# Patient Record
Sex: FEMALE | Race: WHITE | NOT HISPANIC OR LATINO | Employment: UNEMPLOYED | URBAN - METROPOLITAN AREA
[De-identification: names, ages, dates, MRNs, and addresses within clinical notes are randomized per-mention and may not be internally consistent; named-entity substitution may affect disease eponyms.]

---

## 2020-07-24 ENCOUNTER — HOSPITAL ENCOUNTER (EMERGENCY)
Facility: CLINIC | Age: 35
Discharge: HOME | End: 2020-07-25
Attending: EMERGENCY MEDICINE | Admitting: EMERGENCY MEDICINE
Payer: COMMERCIAL

## 2020-07-24 DIAGNOSIS — R79.89 ELEVATED LFTS: ICD-10-CM

## 2020-07-24 DIAGNOSIS — K80.50 BILIARY COLIC: ICD-10-CM

## 2020-07-24 PROCEDURE — 99284 EMERGENCY DEPT VISIT MOD MDM: CPT | Mod: 25

## 2020-07-24 ASSESSMENT — ENCOUNTER SYMPTOMS
ABDOMINAL PAIN: 1
SHORTNESS OF BREATH: 0

## 2020-07-24 NOTE — ED AVS SNAPSHOT
[facility] Emergency Department  [address]  [facility] [address]  Phone:  [phone]  Fax:  [phone]                                    Sharonda Betancur   MRN: [id number]    Department:  [facility] Emergency Department   Date of Visit:  7/24/2020           After Visit Summary Signature Page    I have received my discharge instructions, and my questions have been answered. I have discussed any challenges I see with this plan with the nurse or doctor.    ..........................................................................................................................................  Patient/Patient Representative Signature      ..........................................................................................................................................  Patient Representative Print Name and Relationship to Patient    ..................................................               ................................................  Date                                   Time    ..........................................................................................................................................  Reviewed by Signature/Title    ...................................................              ..............................................  Date                                               Time          22EPIC Rev 08/18

## 2020-07-25 ENCOUNTER — APPOINTMENT (OUTPATIENT)
Dept: ULTRASOUND IMAGING | Facility: CLINIC | Age: 35
End: 2020-07-25
Attending: EMERGENCY MEDICINE
Payer: COMMERCIAL

## 2020-07-25 VITALS
HEART RATE: 125 BPM | TEMPERATURE: 98.4 F | SYSTOLIC BLOOD PRESSURE: 145 MMHG | RESPIRATION RATE: 16 BRPM | DIASTOLIC BLOOD PRESSURE: 89 MMHG | OXYGEN SATURATION: 99 %

## 2020-07-25 LAB
ALBUMIN SERPL-MCNC: 3.8 G/DL (ref 3.4–5)
ALP SERPL-CCNC: 236 U/L (ref 40–150)
ALT SERPL W P-5'-P-CCNC: 176 U/L (ref 0–50)
ANION GAP SERPL CALCULATED.3IONS-SCNC: 6 MMOL/L (ref 3–14)
AST SERPL W P-5'-P-CCNC: 214 U/L (ref 0–45)
B-HCG FREE SERPL-ACNC: <5 IU/L
BASOPHILS # BLD AUTO: 0.1 10E9/L (ref 0–0.2)
BASOPHILS NFR BLD AUTO: 0.3 %
BILIRUB SERPL-MCNC: 0.5 MG/DL (ref 0.2–1.3)
BUN SERPL-MCNC: 23 MG/DL (ref 7–30)
CALCIUM SERPL-MCNC: 8.8 MG/DL (ref 8.5–10.1)
CHLORIDE SERPL-SCNC: 108 MMOL/L (ref 94–109)
CO2 SERPL-SCNC: 26 MMOL/L (ref 20–32)
CREAT SERPL-MCNC: 0.98 MG/DL (ref 0.52–1.04)
DIFFERENTIAL METHOD BLD: ABNORMAL
EOSINOPHIL # BLD AUTO: 0.1 10E9/L (ref 0–0.7)
EOSINOPHIL NFR BLD AUTO: 0.6 %
ERYTHROCYTE [DISTWIDTH] IN BLOOD BY AUTOMATED COUNT: 13.2 % (ref 10–15)
GFR SERPL CREATININE-BSD FRML MDRD: 76 ML/MIN/{1.73_M2}
GLUCOSE SERPL-MCNC: 104 MG/DL (ref 70–99)
HCT VFR BLD AUTO: 45 % (ref 35–47)
HGB BLD-MCNC: 14 G/DL (ref 11.7–15.7)
IMM GRANULOCYTES # BLD: 0.1 10E9/L (ref 0–0.4)
IMM GRANULOCYTES NFR BLD: 0.4 %
LIPASE SERPL-CCNC: 352 U/L (ref 73–393)
LYMPHOCYTES # BLD AUTO: 1.1 10E9/L (ref 0.8–5.3)
LYMPHOCYTES NFR BLD AUTO: 7.4 %
MCH RBC QN AUTO: 29.7 PG (ref 26.5–33)
MCHC RBC AUTO-ENTMCNC: 31.1 G/DL (ref 31.5–36.5)
MCV RBC AUTO: 96 FL (ref 78–100)
MONOCYTES # BLD AUTO: 0.9 10E9/L (ref 0–1.3)
MONOCYTES NFR BLD AUTO: 6.2 %
NEUTROPHILS # BLD AUTO: 12.3 10E9/L (ref 1.6–8.3)
NEUTROPHILS NFR BLD AUTO: 85.1 %
NRBC # BLD AUTO: 0 10*3/UL
NRBC BLD AUTO-RTO: 0 /100
PLATELET # BLD AUTO: 228 10E9/L (ref 150–450)
POTASSIUM SERPL-SCNC: 4.2 MMOL/L (ref 3.4–5.3)
PROT SERPL-MCNC: 7.6 G/DL (ref 6.8–8.8)
RBC # BLD AUTO: 4.71 10E12/L (ref 3.8–5.2)
SODIUM SERPL-SCNC: 140 MMOL/L (ref 133–144)
WBC # BLD AUTO: 14.5 10E9/L (ref 4–11)

## 2020-07-25 PROCEDURE — 76705 ECHO EXAM OF ABDOMEN: CPT

## 2020-07-25 PROCEDURE — 83690 ASSAY OF LIPASE: CPT | Performed by: EMERGENCY MEDICINE

## 2020-07-25 PROCEDURE — 84702 CHORIONIC GONADOTROPIN TEST: CPT

## 2020-07-25 PROCEDURE — 80053 COMPREHEN METABOLIC PANEL: CPT | Performed by: EMERGENCY MEDICINE

## 2020-07-25 PROCEDURE — 85025 COMPLETE CBC W/AUTO DIFF WBC: CPT | Performed by: EMERGENCY MEDICINE

## 2020-07-25 ASSESSMENT — ENCOUNTER SYMPTOMS
COUGH: 0
NAUSEA: 0
VOMITING: 0
FEVER: 0

## 2020-07-25 NOTE — ED PROVIDER NOTES
History   Chief Complaint  Abdominal Pain    The history is provided by the patient.      Sharonda Betancur is a 34 year old female 11 weeks postpartum who presents for evaluation of severe abdominal pain that began while unloading the  this evening. It started as a cramping sensation and progressed to more severe pain. At its worst she rated it as a 9/10 in severity. The pain is above her belly button and below her diaphragm. The pain radiated into her back. She denies shortness of breath. She does note that the pain is improved now and rates it as a 2/10 in severity. No history of abdominal surgery. She had a similar feeling a week ago. She has not had dinner, but for lunch she had breaded chicken for lunch.     Allergies  Sulfa Drugs    Medications  Levothyroxine  Albuterol inhaler  Bupropion inhaler  Seroquel    Past Medical History  Macrocosmic baby  Dermatofibroma of RLE  Bipolar II disorder  Hypothyroidism   Asthma  Migraines     Past Surgical History  Tonsillectomy and adenoidectomy   Toe surgery  [place] teeth extraction  Levonorgestrel releasing system     Family History  Father - depression  Mother - asthma, depression, borderline personality disorder, preeclampsia   Sister(s) - dissociative disorder, preeclampsia     Social History  The patient was unaccompanied to the ED.  Smoking Status: never  Smokeless Tobacco: never  Alcohol Use: not currently    Review of Systems   Constitutional: Negative for fever.   Respiratory: Negative for cough and shortness of breath.    Cardiovascular: Negative for chest pain.   Gastrointestinal: Positive for abdominal pain. Negative for nausea and vomiting.   All other systems reviewed and are negative.    Physical Exam     Physical Exam    Patient Vitals for the past 24 hrs:   BP Temp Temp src Pulse Heart Rate Resp SpO2   07/24/20 2250 136/89 98.4  F (36.9  C) Oral 111 111 16 100 %     Nursing note and vitals reviewed.  Constitutional: Cooperative.   HENT:    Mouth/Throat: Moist mucous membranes.   Eyes: EOMI, nonicteric sclera  Cardiovascular: Normal rate at time of my exam, regular rhythm, no murmurs, rubs, or gallops  Pulmonary/Chest: Effort normal and breath sounds normal. No respiratory distress. No wheezes. No rales.   Abdominal: Soft. Mild RUQ TTP, nondistended, no guarding or rigidity.   Musculoskeletal: Normal range of motion.   Neurological: Alert. Moves all extremities spontaneously.   Skin: Skin is warm and dry. No rash noted.   Psychiatric: Normal mood and affect.     Emergency Department Course   Imaging:  Radiology findings were communicated with the patient who voiced understanding of the findings.    Abdomen US, limited (RUQ only)   Final Result   IMPRESSION:   1.  Gallbladder sludge without other evidence of cholecystitis.        Laboratory:  Laboratory findings were communicated with the patient who voiced understanding of the findings.    Lipase: 352  CBC: WBC 14.5 (H) o/w WNL (HGB 14.0, )  ISTAT HCG Quantitative: <5.0    Emergency Department Course:  Past medical records, nursing notes, and vitals reviewed.    2345 I physically examined the patient as documented above.    IV was inserted and blood was drawn for laboratory testing, results above.    The patient was sent for radiographs while in the emergency department, results above.     0118 I rechecked the patient and discussed the findings of their workup thus far.     Findings and plan explained to the Patient. Patient discharged home with instructions regarding supportive care, medications, and reasons to return. The importance of close follow-up was reviewed.     I personally reviewed the laboratory and imaging results with the Patient and answered all related questions prior to discharge.     Impression & Plan   Medical Decision Makin-year-old female presents with sudden onset cramping upper abdominal/right upper quadrant abdominal pain.  She reports this felt like a labor pain.   It was so severe she called EMS, the by the time she arrived to emergency department, she was essentially pain-free.  Her exam is notable for some mild right upper quadrant tenderness to palpation.  Her vital signs are normal apart from some mild tachycardia on arrival that improved while she was here.  I considered a broad differential for patient including biliary disease, reflux, kidney stone, bowel obstruction.  I also considered more rare but life-threatening conditions such as aortic dissection, ACS, PE.  Given patient is essentially pain-free upon arrival, the above emergent etiologies are quite unlikely.  Further corroborating this is patient's lab work which is suggestive of elevated LFTs, and her right upper quadrant ultrasound that does suggest some sludge and debris in her gallbladder.  Symptoms are classic for biliary colic, and patient was given discharge instructions for follow-up with primary care for LFT recheck with consideration of surgical evaluation at that time.  Patient is symptomatically improved without intervention at time of discharge.  All questions answered.    Diagnosis:    ICD-10-CM    1. Biliary colic  K80.50    2. Elevated LFTs  R79.89      Disposition:  Discharged to home.    Discharge Medications:  New Prescriptions    No medications on file       Scribe Disclosure:  I, Jennifer Hobbs, am serving as a scribe at 11:09 PM on 7/24/2020 to document services personally performed by Jose Juan Reece MD based on my observations and the provider's statements to me.      Jose Juan Reece MD  07/25/20 0663

## 2020-07-25 NOTE — ED NOTES
Bed: ED25  Expected date:   Expected time:   Means of arrival:   Comments:  Jules 596 34F abd pain

## 2020-12-27 ENCOUNTER — HEALTH MAINTENANCE LETTER (OUTPATIENT)
Age: 35
End: 2020-12-27

## 2021-10-09 ENCOUNTER — HEALTH MAINTENANCE LETTER (OUTPATIENT)
Age: 36
End: 2021-10-09

## 2022-01-29 ENCOUNTER — HEALTH MAINTENANCE LETTER (OUTPATIENT)
Age: 37
End: 2022-01-29

## 2022-09-11 ENCOUNTER — HEALTH MAINTENANCE LETTER (OUTPATIENT)
Age: 37
End: 2022-09-11

## 2023-05-06 ENCOUNTER — HEALTH MAINTENANCE LETTER (OUTPATIENT)
Age: 38
End: 2023-05-06